# Patient Record
Sex: MALE | Race: WHITE | Employment: UNEMPLOYED | URBAN - METROPOLITAN AREA
[De-identification: names, ages, dates, MRNs, and addresses within clinical notes are randomized per-mention and may not be internally consistent; named-entity substitution may affect disease eponyms.]

---

## 2019-08-24 ENCOUNTER — APPOINTMENT (OUTPATIENT)
Dept: CT IMAGING | Age: 24
End: 2019-08-24
Attending: EMERGENCY MEDICINE
Payer: SELF-PAY

## 2019-08-24 ENCOUNTER — HOSPITAL ENCOUNTER (EMERGENCY)
Age: 24
Discharge: HOME | End: 2019-08-24
Attending: EMERGENCY MEDICINE
Payer: SELF-PAY

## 2019-08-24 ENCOUNTER — APPOINTMENT (OUTPATIENT)
Dept: ULTRASOUND IMAGING | Age: 24
End: 2019-08-24
Attending: EMERGENCY MEDICINE
Payer: SELF-PAY

## 2019-08-24 VITALS
WEIGHT: 269.18 LBS | BODY MASS INDEX: 35.68 KG/M2 | OXYGEN SATURATION: 97 % | DIASTOLIC BLOOD PRESSURE: 102 MMHG | SYSTOLIC BLOOD PRESSURE: 149 MMHG | HEART RATE: 87 BPM | TEMPERATURE: 98.8 F | HEIGHT: 73 IN | RESPIRATION RATE: 18 BRPM

## 2019-08-24 DIAGNOSIS — R19.7 DIARRHEA, UNSPECIFIED TYPE: ICD-10-CM

## 2019-08-24 DIAGNOSIS — E87.6 HYPOKALEMIA: ICD-10-CM

## 2019-08-24 DIAGNOSIS — K76.0 HEPATIC STEATOSIS: ICD-10-CM

## 2019-08-24 DIAGNOSIS — R10.13 ABDOMINAL PAIN, EPIGASTRIC: ICD-10-CM

## 2019-08-24 DIAGNOSIS — K52.9 ILEITIS: Primary | ICD-10-CM

## 2019-08-24 LAB
ALBUMIN SERPL-MCNC: 3.9 G/DL (ref 3.5–5)
ALBUMIN/GLOB SERPL: 1.1 {RATIO} (ref 1.1–2.2)
ALP SERPL-CCNC: 98 U/L (ref 45–117)
ALT SERPL-CCNC: 31 U/L (ref 12–78)
ANION GAP SERPL CALC-SCNC: 10 MMOL/L (ref 5–15)
APPEARANCE UR: CLEAR
AST SERPL-CCNC: 45 U/L (ref 15–37)
BACTERIA URNS QL MICRO: NEGATIVE /HPF
BASOPHILS # BLD: 0.1 K/UL (ref 0–0.1)
BASOPHILS NFR BLD: 1 % (ref 0–1)
BILIRUB SERPL-MCNC: 2.3 MG/DL (ref 0.2–1)
BILIRUB UR QL: NEGATIVE
BUN SERPL-MCNC: 5 MG/DL (ref 6–20)
BUN/CREAT SERPL: 5 (ref 12–20)
CALCIUM SERPL-MCNC: 8.9 MG/DL (ref 8.5–10.1)
CHLORIDE SERPL-SCNC: 102 MMOL/L (ref 97–108)
CO2 SERPL-SCNC: 27 MMOL/L (ref 21–32)
COLOR UR: ABNORMAL
CREAT SERPL-MCNC: 0.98 MG/DL (ref 0.7–1.3)
DIFFERENTIAL METHOD BLD: ABNORMAL
EOSINOPHIL # BLD: 0.3 K/UL (ref 0–0.4)
EOSINOPHIL NFR BLD: 4 % (ref 0–7)
EPITH CASTS URNS QL MICRO: ABNORMAL /LPF
ERYTHROCYTE [DISTWIDTH] IN BLOOD BY AUTOMATED COUNT: 15.6 % (ref 11.5–14.5)
GLOBULIN SER CALC-MCNC: 3.6 G/DL (ref 2–4)
GLUCOSE SERPL-MCNC: 99 MG/DL (ref 65–100)
GLUCOSE UR STRIP.AUTO-MCNC: NEGATIVE MG/DL
HCT VFR BLD AUTO: 40.5 % (ref 36.6–50.3)
HEMOCCULT STL QL: NEGATIVE
HGB BLD-MCNC: 14 G/DL (ref 12.1–17)
HGB UR QL STRIP: NEGATIVE
IMM GRANULOCYTES # BLD AUTO: 0 K/UL (ref 0–0.04)
IMM GRANULOCYTES NFR BLD AUTO: 1 % (ref 0–0.5)
KETONES UR QL STRIP.AUTO: NEGATIVE MG/DL
LEUKOCYTE ESTERASE UR QL STRIP.AUTO: NEGATIVE
LIPASE SERPL-CCNC: 134 U/L (ref 73–393)
LYMPHOCYTES # BLD: 1 K/UL (ref 0.8–3.5)
LYMPHOCYTES NFR BLD: 13 % (ref 12–49)
MAGNESIUM SERPL-MCNC: 2.3 MG/DL (ref 1.6–2.4)
MCH RBC QN AUTO: 33.3 PG (ref 26–34)
MCHC RBC AUTO-ENTMCNC: 34.6 G/DL (ref 30–36.5)
MCV RBC AUTO: 96.2 FL (ref 80–99)
MONOCYTES # BLD: 0.7 K/UL (ref 0–1)
MONOCYTES NFR BLD: 10 % (ref 5–13)
MUCOUS THREADS URNS QL MICRO: ABNORMAL /LPF
NEUTS SEG # BLD: 5.3 K/UL (ref 1.8–8)
NEUTS SEG NFR BLD: 71 % (ref 32–75)
NITRITE UR QL STRIP.AUTO: NEGATIVE
NRBC # BLD: 0 K/UL (ref 0–0.01)
NRBC BLD-RTO: 0 PER 100 WBC
PH UR STRIP: 6 [PH] (ref 5–8)
PLATELET # BLD AUTO: 242 K/UL (ref 150–400)
PMV BLD AUTO: 9.1 FL (ref 8.9–12.9)
POTASSIUM SERPL-SCNC: 3 MMOL/L (ref 3.5–5.1)
PROT SERPL-MCNC: 7.5 G/DL (ref 6.4–8.2)
PROT UR STRIP-MCNC: ABNORMAL MG/DL
RBC # BLD AUTO: 4.21 M/UL (ref 4.1–5.7)
RBC #/AREA URNS HPF: ABNORMAL /HPF (ref 0–5)
SODIUM SERPL-SCNC: 139 MMOL/L (ref 136–145)
SP GR UR REFRACTOMETRY: 1.02 (ref 1–1.03)
UROBILINOGEN UR QL STRIP.AUTO: 0.2 EU/DL (ref 0.2–1)
WBC # BLD AUTO: 7.4 K/UL (ref 4.1–11.1)
WBC URNS QL MICRO: ABNORMAL /HPF (ref 0–4)

## 2019-08-24 PROCEDURE — 81001 URINALYSIS AUTO W/SCOPE: CPT

## 2019-08-24 PROCEDURE — 76705 ECHO EXAM OF ABDOMEN: CPT

## 2019-08-24 PROCEDURE — 96374 THER/PROPH/DIAG INJ IV PUSH: CPT

## 2019-08-24 PROCEDURE — 83690 ASSAY OF LIPASE: CPT

## 2019-08-24 PROCEDURE — 74011250636 HC RX REV CODE- 250/636: Performed by: EMERGENCY MEDICINE

## 2019-08-24 PROCEDURE — 74011000250 HC RX REV CODE- 250: Performed by: EMERGENCY MEDICINE

## 2019-08-24 PROCEDURE — 89055 LEUKOCYTE ASSESSMENT FECAL: CPT

## 2019-08-24 PROCEDURE — 83735 ASSAY OF MAGNESIUM: CPT

## 2019-08-24 PROCEDURE — 74177 CT ABD & PELVIS W/CONTRAST: CPT

## 2019-08-24 PROCEDURE — 87506 IADNA-DNA/RNA PROBE TQ 6-11: CPT

## 2019-08-24 PROCEDURE — 74011636320 HC RX REV CODE- 636/320: Performed by: EMERGENCY MEDICINE

## 2019-08-24 PROCEDURE — 85025 COMPLETE CBC W/AUTO DIFF WBC: CPT

## 2019-08-24 PROCEDURE — 82272 OCCULT BLD FECES 1-3 TESTS: CPT

## 2019-08-24 PROCEDURE — 93005 ELECTROCARDIOGRAM TRACING: CPT

## 2019-08-24 PROCEDURE — 96375 TX/PRO/DX INJ NEW DRUG ADDON: CPT

## 2019-08-24 PROCEDURE — 99285 EMERGENCY DEPT VISIT HI MDM: CPT

## 2019-08-24 PROCEDURE — 36415 COLL VENOUS BLD VENIPUNCTURE: CPT

## 2019-08-24 PROCEDURE — 80053 COMPREHEN METABOLIC PANEL: CPT

## 2019-08-24 RX ORDER — OMEPRAZOLE 20 MG/1
20 TABLET, DELAYED RELEASE ORAL DAILY
COMMUNITY

## 2019-08-24 RX ORDER — ONDANSETRON 4 MG/1
4 TABLET, ORALLY DISINTEGRATING ORAL
Qty: 10 TAB | Refills: 0 | Status: SHIPPED | OUTPATIENT
Start: 2019-08-24

## 2019-08-24 RX ORDER — POTASSIUM CHLORIDE 1.5 G/1.77G
20 POWDER, FOR SOLUTION ORAL DAILY
Qty: 7 PACKET | Refills: 0 | Status: SHIPPED | OUTPATIENT
Start: 2019-08-24

## 2019-08-24 RX ORDER — DICYCLOMINE HYDROCHLORIDE 20 MG/1
20 TABLET ORAL
Qty: 20 TAB | Refills: 0 | Status: SHIPPED | OUTPATIENT
Start: 2019-08-24

## 2019-08-24 RX ORDER — ONDANSETRON 2 MG/ML
4 INJECTION INTRAMUSCULAR; INTRAVENOUS
Status: COMPLETED | OUTPATIENT
Start: 2019-08-24 | End: 2019-08-24

## 2019-08-24 RX ORDER — CEPHALEXIN 500 MG/1
500 CAPSULE ORAL 4 TIMES DAILY
Qty: 28 CAP | Refills: 0 | Status: SHIPPED | OUTPATIENT
Start: 2019-08-24 | End: 2019-08-31

## 2019-08-24 RX ORDER — SODIUM CHLORIDE 0.9 % (FLUSH) 0.9 %
10 SYRINGE (ML) INJECTION
Status: COMPLETED | OUTPATIENT
Start: 2019-08-24 | End: 2019-08-24

## 2019-08-24 RX ORDER — FENTANYL CITRATE 50 UG/ML
50 INJECTION, SOLUTION INTRAMUSCULAR; INTRAVENOUS
Status: COMPLETED | OUTPATIENT
Start: 2019-08-24 | End: 2019-08-24

## 2019-08-24 RX ORDER — METRONIDAZOLE 500 MG/1
500 TABLET ORAL 3 TIMES DAILY
Qty: 21 TAB | Refills: 0 | Status: SHIPPED | OUTPATIENT
Start: 2019-08-24 | End: 2019-08-31

## 2019-08-24 RX ADMIN — IOPAMIDOL 100 ML: 755 INJECTION, SOLUTION INTRAVENOUS at 13:53

## 2019-08-24 RX ADMIN — FAMOTIDINE 20 MG: 10 INJECTION, SOLUTION INTRAVENOUS at 11:31

## 2019-08-24 RX ADMIN — ONDANSETRON 4 MG: 2 INJECTION INTRAMUSCULAR; INTRAVENOUS at 13:10

## 2019-08-24 RX ADMIN — Medication 10 ML: at 13:53

## 2019-08-24 RX ADMIN — FENTANYL CITRATE 50 MCG: 50 INJECTION, SOLUTION INTRAMUSCULAR; INTRAVENOUS at 13:10

## 2019-08-24 NOTE — ED TRIAGE NOTES
Assumed care of pt from triage. Pt CC of abd pain over whole abd and diarrhea x a few months. Pt reports that he came in today because it has not gotten better. Pt denies SOB. Pt report pressure in mid chest.  Pt placed on monitor x3. Call bell in reach. Visitor at bedside.

## 2019-08-24 NOTE — ED PROVIDER NOTES
EMERGENCY DEPARTMENT HISTORY AND PHYSICAL EXAM      Date: 8/24/2019  Patient Name: Radha Garza    History of Presenting Illness     Chief Complaint   Patient presents with    Epigastric Pain     reports upper abd pain radiating into back for months with intermittent vomiting and diarrhea. worse with eating       History Provided By: Patient    HPI: Radha Garza, 22 y.o. male presents to the ED with cc of abdominal pain. Patient's pain has been intermittent for months. He states that it is primarily in the upper abdomen and radiates to the back. Usually lasts for 15 to 20 minutes at a time and is of moderate severity. Currently, the pain is a 5 out of 10 in severity. He also has diarrhea at least 3 times a day over the last month. He denies recent foreign travel or antibiotic use. He also denies fever or chills. He has had intermittent chest pain radiating up from the epigastrium. Denies shortness of breath, headache or dysuria. His friend states that he was a heavy drinker until 2 days ago. There are no other complaints, changes, or physical findings at this time. PCP: Other, MD Karen    No current facility-administered medications on file prior to encounter. Current Outpatient Medications on File Prior to Encounter   Medication Sig Dispense Refill    omeprazole (PRILOSEC OTC) 20 mg tablet Take 20 mg by mouth daily.          Past History     Past Medical History:  Past Medical History:   Diagnosis Date    HA (headache) 9/16/2008    Poison ivy 5/18/2008    Sinusitis 1/8/2009    Streptococcal pharyngitis 4/2/2007    Tinea pedis 5/18/2007       Past Surgical History:  Past Surgical History:   Procedure Laterality Date    NC ASPIRAT/INJECTION GANGLION CYST(S)  Oct 2009       Family History:  Family History   Problem Relation Age of Onset    Immunodeficiency Paternal Grandmother     High Cholesterol Paternal Grandmother     Hypertension Father        Social History:  Social History Tobacco Use    Smoking status: Never Smoker    Smokeless tobacco: Never Used   Substance Use Topics    Alcohol use: Yes     Comment: occationally    Drug use: Yes     Types: Marijuana       Allergies: Allergies   Allergen Reactions    Doxycycline Nausea and Vomiting         Review of Systems   Review of Systems   Constitutional: Negative for fever. HENT: Negative for congestion. Eyes: Negative. Respiratory: Negative for shortness of breath. Cardiovascular: Positive for chest pain. Gastrointestinal: Positive for abdominal pain and diarrhea. Genitourinary: Negative for flank pain. Musculoskeletal: Positive for back pain. Skin: Negative. Allergic/Immunologic: Negative for immunocompromised state. Neurological: Negative for headaches. Psychiatric/Behavioral: Negative. All other systems reviewed and are negative. Physical Exam   Physical Exam   Constitutional: He is oriented to person, place, and time. He appears distressed. Elevated BMI   HENT:   Head: Normocephalic and atraumatic. Eyes: Pupils are equal, round, and reactive to light. Conjunctivae and EOM are normal.   Neck: Normal range of motion. Neck supple. Cardiovascular: Normal rate, regular rhythm, normal heart sounds and intact distal pulses. Pulmonary/Chest: Effort normal and breath sounds normal. He has no wheezes. Abdominal: Soft. Bowel sounds are normal. There is tenderness. Epigastric tenderness   Musculoskeletal: Normal range of motion. He exhibits no edema or tenderness. Neurological: He is alert and oriented to person, place, and time. No cranial nerve deficit. Skin: Skin is warm and dry. Psychiatric: He has a normal mood and affect. His behavior is normal.   Nursing note and vitals reviewed.       Diagnostic Study Results     Labs -     Recent Results (from the past 12 hour(s))   CBC WITH AUTOMATED DIFF    Collection Time: 08/24/19 11:37 AM   Result Value Ref Range    WBC 7.4 4.1 - 11.1 K/uL RBC 4.21 4.10 - 5.70 M/uL    HGB 14.0 12.1 - 17.0 g/dL    HCT 40.5 36.6 - 50.3 %    MCV 96.2 80.0 - 99.0 FL    MCH 33.3 26.0 - 34.0 PG    MCHC 34.6 30.0 - 36.5 g/dL    RDW 15.6 (H) 11.5 - 14.5 %    PLATELET 759 799 - 707 K/uL    MPV 9.1 8.9 - 12.9 FL    NRBC 0.0 0  WBC    ABSOLUTE NRBC 0.00 0.00 - 0.01 K/uL    NEUTROPHILS 71 32 - 75 %    LYMPHOCYTES 13 12 - 49 %    MONOCYTES 10 5 - 13 %    EOSINOPHILS 4 0 - 7 %    BASOPHILS 1 0 - 1 %    IMMATURE GRANULOCYTES 1 (H) 0.0 - 0.5 %    ABS. NEUTROPHILS 5.3 1.8 - 8.0 K/UL    ABS. LYMPHOCYTES 1.0 0.8 - 3.5 K/UL    ABS. MONOCYTES 0.7 0.0 - 1.0 K/UL    ABS. EOSINOPHILS 0.3 0.0 - 0.4 K/UL    ABS. BASOPHILS 0.1 0.0 - 0.1 K/UL    ABS. IMM. GRANS. 0.0 0.00 - 0.04 K/UL    DF AUTOMATED     METABOLIC PANEL, COMPREHENSIVE    Collection Time: 08/24/19 11:37 AM   Result Value Ref Range    Sodium 139 136 - 145 mmol/L    Potassium 3.0 (L) 3.5 - 5.1 mmol/L    Chloride 102 97 - 108 mmol/L    CO2 27 21 - 32 mmol/L    Anion gap 10 5 - 15 mmol/L    Glucose 99 65 - 100 mg/dL    BUN 5 (L) 6 - 20 MG/DL    Creatinine 0.98 0.70 - 1.30 MG/DL    BUN/Creatinine ratio 5 (L) 12 - 20      GFR est AA >60 >60 ml/min/1.73m2    GFR est non-AA >60 >60 ml/min/1.73m2    Calcium 8.9 8.5 - 10.1 MG/DL    Bilirubin, total 2.3 (H) 0.2 - 1.0 MG/DL    ALT (SGPT) 31 12 - 78 U/L    AST (SGOT) 45 (H) 15 - 37 U/L    Alk. phosphatase 98 45 - 117 U/L    Protein, total 7.5 6.4 - 8.2 g/dL    Albumin 3.9 3.5 - 5.0 g/dL    Globulin 3.6 2.0 - 4.0 g/dL    A-G Ratio 1.1 1.1 - 2.2     LIPASE    Collection Time: 08/24/19 11:37 AM   Result Value Ref Range    Lipase 134 73 - 393 U/L       Radiologic Studies -   CT ABD PELV W CONT   Final Result   IMPRESSION:   Focal wall thickening in the terminal ileum suggests inflammatory ileitis. Trace   pelvic free fluid. US ABD LTD   Final Result   IMPRESSION:    Diffusely increased hepatic echogenicity suggests hepatic parenchymal disease,   likely hepatic steatosis.  No evidence of cholecystolithiasis or acute   cholecystitis. CT Results  (Last 48 hours)               08/24/19 1358  CT ABD PELV W CONT Final result    Impression:  IMPRESSION:   Focal wall thickening in the terminal ileum suggests inflammatory ileitis. Trace   pelvic free fluid. Narrative:  EXAM: CT ABD PELV W CONT       INDICATION: Abdominal pain; Diarrhea        COMPARISON: None        CONTRAST: 100 mL of Isovue-370. TECHNIQUE:    Following the uneventful intravenous administration of contrast, thin axial   images were obtained through the abdomen and pelvis. Coronal and sagittal   reconstructions were generated. Oral contrast was not administered. CT dose   reduction was achieved through use of a standardized protocol tailored for this   examination and automatic exposure control for dose modulation. FINDINGS:    LUNG BASES: Clear. INCIDENTALLY IMAGED HEART AND MEDIASTINUM: Unremarkable. LIVER: No mass or biliary dilatation. GALLBLADDER: Unremarkable. SPLEEN: No mass. PANCREAS: No mass or ductal dilatation. ADRENALS: Unremarkable. KIDNEYS: No mass, calculus, or hydronephrosis. STOMACH: Unremarkable. SMALL BOWEL: Focal wall thickening in the terminal ileum. COLON: No dilatation or wall thickening. APPENDIX: Not visualized. PERITONEUM: Trace pelvic free fluid. No pneumoperitoneum. RETROPERITONEUM: No lymphadenopathy or aortic aneurysm. REPRODUCTIVE ORGANS: Normal sized prostate gland. URINARY BLADDER: No mass or calculus. BONES: No destructive bone lesion. ADDITIONAL COMMENTS: N/A               CXR Results  (Last 48 hours)    None          Medical Decision Making   I am the first provider for this patient. I reviewed the vital signs, available nursing notes, past medical history, past surgical history, family history and social history. Vital Signs-Reviewed the patient's vital signs.   Patient Vitals for the past 12 hrs:   Temp Pulse Resp BP SpO2   08/24/19 0942     100 %   08/24/19 0901 98.1 °F (36.7 °C) 89 18 (!) 180/102 100 %       EKG interpretation: (Preliminary)  Rhythm: normal sinus rhythm and PAC's; and regular . Rate (approx.): 72; Axis: normal; NM interval: normal; QRS interval: normal ; ST/T wave: normal; Other findings: no prior EKGs. Records Reviewed: Nursing Notes, Old Medical Records, Previous Radiology Studies and Previous Laboratory Studies    Provider Notes (Medical Decision Making):   Pancreatitis, gastritis, biliary colic, peptic ulcer disease, dehydration, electrolyte abnormality, colitis, diverticulitis, gastroenteritis    ED Course:   Initial assessment performed. The patients presenting problems have been discussed, and they are in agreement with the care plan formulated and outlined with them. I have encouraged them to ask questions as they arise throughout their visit. Progress note: The patient's results have been reviewed. The patient is feeling better. He is advised to follow-up and return to ER if worse         Critical Care Time:   0    Disposition:  home    PLAN:  1. Discharge Medication List as of 8/24/2019  4:43 PM      START taking these medications    Details   dicyclomine (BENTYL) 20 mg tablet Take 1 Tab by mouth every six (6) hours as needed (abdominal cramps) for up to 20 doses. , Normal, Disp-20 Tab, R-0      ondansetron (ZOFRAN ODT) 4 mg disintegrating tablet Take 1 Tab by mouth every eight (8) hours as needed for Nausea., Normal, Disp-10 Tab, R-0      potassium chloride (KLOR-CON) 20 mEq pack Take 1 Packet by mouth daily. , Normal, Disp-7 Packet, R-0      cephALEXin (KEFLEX) 500 mg capsule Take 1 Cap by mouth four (4) times daily for 7 days. , Normal, Disp-28 Cap, R-0      metroNIDAZOLE (FLAGYL) 500 mg tablet Take 1 Tab by mouth three (3) times daily for 7 days. , Normal, Disp-21 Tab, R-0         CONTINUE these medications which have NOT CHANGED    Details   omeprazole (PRILOSEC OTC) 20 mg tablet Take 20 mg by mouth daily., Historical Med           2. Follow-up Information     Follow up With Specialties Details Why Contact Info    See your doctor  In 2 days As needed     Mackenzie Sosa MD Gastroenterology  As needed [address]  [address] [facility]  [address] [facility] [address]  [phone]      [facility] EMERGENCY DEPT Emergency Medicine  If symptoms worsen [address] [facility]  [address] [facility]  [phone]        Return to ED if worse     Diagnosis     Clinical Impression:   1. Ileitis    2. Abdominal pain, epigastric    3. Hepatic steatosis    4. Hypokalemia    5. Diarrhea, unspecified type        Attestations:    Yen Wilson MD    Please note that this dictation was completed with [employer] Touch, the computer voice recognition software. Quite often unanticipated grammatical, syntax, homophones, and other interpretive errors are inadvertently transcribed by the computer software. Please disregard these errors. Please excuse any errors that have escaped final proofreading. Thank you.

## 2019-08-24 NOTE — DISCHARGE INSTRUCTIONS
Patient Education        Abdominal Pain: Care Instructions  Your Care Instructions    Abdominal pain has many possible causes. Some aren't serious and get better on their own in a few days. Others need more testing and treatment. If your pain continues or gets worse, you need to be rechecked and may need more tests to find out what is wrong. You may need surgery to correct the problem. Don't ignore new symptoms, such as fever, nausea and vomiting, urination problems, pain that gets worse, and dizziness. These may be signs of a more serious problem. Your doctor may have recommended a follow-up visit in the next 8 to 12 hours. If you are not getting better, you may need more tests or treatment. The doctor has checked you carefully, but problems can develop later. If you notice any problems or new symptoms, get medical treatment right away. Follow-up care is a key part of your treatment and safety. Be sure to make and go to all appointments, and call your doctor if you are having problems. It's also a good idea to know your test results and keep a list of the medicines you take. How can you care for yourself at home? · Rest until you feel better. · To prevent dehydration, drink plenty of fluids, enough so that your urine is light yellow or clear like water. Choose water and other caffeine-free clear liquids until you feel better. If you have kidney, heart, or liver disease and have to limit fluids, talk with your doctor before you increase the amount of fluids you drink. · If your stomach is upset, eat mild foods, such as rice, dry toast or crackers, bananas, and applesauce. Try eating several small meals instead of two or three large ones. · Wait until 48 hours after all symptoms have gone away before you have spicy foods, alcohol, and drinks that contain caffeine. · Do not eat foods that are high in fat. · Avoid anti-inflammatory medicines such as aspirin, ibuprofen (Advil, Motrin), and naproxen (Aleve). These can cause stomach upset. Talk to your doctor if you take daily aspirin for another health problem. When should you call for help? Call 911 anytime you think you may need emergency care. For example, call if:    · You passed out (lost consciousness).     · You pass maroon or very bloody stools.     · You vomit blood or what looks like coffee grounds.     · You have new, severe belly pain.    Call your doctor now or seek immediate medical care if:    · Your pain gets worse, especially if it becomes focused in one area of your belly.     · You have a new or higher fever.     · Your stools are black and look like tar, or they have streaks of blood.     · You have unexpected vaginal bleeding.     · You have symptoms of a urinary tract infection. These may include:  ? Pain when you urinate. ? Urinating more often than usual.  ? Blood in your urine.     · You are dizzy or lightheaded, or you feel like you may faint.    Watch closely for changes in your health, and be sure to contact your doctor if:    · You are not getting better after 1 day (24 hours). Where can you learn more? Go to http://heshamTenfootlourdes.info/. Enter P166 in the search box to learn more about \"Abdominal Pain: Care Instructions. \"  Current as of: September 23, 2018  Content Version: 12.1  © 1923-2265 [employer]. Care instructions adapted under license by [employer] (which disclaims liability or warranty for this information). If you have questions about a medical condition or this instruction, always ask your healthcare professional. [place] [address] any warranty or liability for your use of this information. Patient Education        Diarrhea: Care Instructions  Your Care Instructions    Diarrhea is loose, watery stools (bowel movements). The exact cause is often hard to find. Sometimes diarrhea is your body's way of getting rid of what caused an upset stomach.  Viruses, food poisoning, and many medicines can cause diarrhea. Some people get diarrhea in response to emotional stress, anxiety, or certain foods. Almost everyone has diarrhea now and then. It usually isn't serious, and your stools will return to normal soon. The important thing to do is replace the fluids you have lost, so you can prevent dehydration. The doctor has checked you carefully, but problems can develop later. If you notice any problems or new symptoms, get medical treatment right away. Follow-up care is a key part of your treatment and safety. Be sure to make and go to all appointments, and call your doctor if you are having problems. It's also a good idea to know your test results and keep a list of the medicines you take. How can you care for yourself at home? · Watch for signs of dehydration, which means your body has lost too much water. Dehydration is a serious condition and should be treated right away. Signs of dehydration are:  ? Increasing thirst and dry eyes and mouth. ? Feeling faint or lightheaded. ? Darker urine, and a smaller amount of urine than normal.  · To prevent dehydration, drink plenty of fluids, enough so that your urine is light yellow or clear like water. Choose water and other caffeine-free clear liquids until you feel better. If you have kidney, heart, or liver disease and have to limit fluids, talk with your doctor before you increase the amount of fluids you drink. · Begin eating small amounts of mild foods the next day, if you feel like it. ? Try yogurt that has live cultures of Lactobacillus. (Check the label.)  ? Avoid spicy foods, fruits, alcohol, and caffeine until 48 hours after all symptoms are gone. ? Avoid chewing gum that contains sorbitol. ? Avoid dairy products (except for yogurt with Lactobacillus) while you have diarrhea and for 3 days after symptoms are gone.   · The doctor may recommend that you take over-the-counter medicine, such as loperamide (Imodium), if you still have diarrhea after 6 hours. Read and follow all instructions on the label. Do not use this medicine if you have bloody diarrhea, a high fever, or other signs of serious illness. Call your doctor if you think you are having a problem with your medicine. When should you call for help? Call 911 anytime you think you may need emergency care. For example, call if:    · You passed out (lost consciousness).     · Your stools are maroon or very bloody.    Call your doctor now or seek immediate medical care if:    · You are dizzy or lightheaded, or you feel like you may faint.     · Your stools are black and look like tar, or they have streaks of blood.     · You have new or worse belly pain.     · You have symptoms of dehydration, such as:  ? Dry eyes and a dry mouth. ? Passing only a little dark urine. ? Feeling thirstier than usual.     · You have a new or higher fever.    Watch closely for changes in your health, and be sure to contact your doctor if:    · Your diarrhea is getting worse.     · You see pus in the diarrhea.     · You are not getting better after 2 days (48 hours). Where can you learn more? Go to http://hesham-lourdes.info/. Enter V903 in the search box to learn more about \"Diarrhea: Care Instructions. \"  Current as of: September 23, 2018  Content Version: 12.1  © 5469-9463 [employer]. Care instructions adapted under license by [employer] (which disclaims liability or warranty for this information). If you have questions about a medical condition or this instruction, always ask your healthcare professional. [place] [address] any warranty or liability for your use of this information. Patient Education        Hypokalemia: Care Instructions  Your Care Instructions    Hypokalemia (say \"[id number]-vb-[id number]-TRUDY-payal-uh\") is a low level of potassium. The heart, muscles, kidneys, and nervous system all need potassium to work well.   This problem has many different causes. Kidney problems, diet, and medicines like diuretics and laxatives can cause it. So can vomiting or diarrhea. In some cases, cancer is the cause. Your doctor may do tests to find the cause of your low potassium levels. You may need medicines to bring your potassium levels back to normal. You may also need regular blood tests to check your potassium. If you have very low potassium, you may need intravenous (IV) medicines. You also may need tests to check the electrical activity of your heart. Heart problems caused by low potassium levels can be very serious. Follow-up care is a key part of your treatment and safety. Be sure to make and go to all appointments, and call your doctor if you are having problems. It's also a good idea to know your test results and keep a list of the medicines you take. How can you care for yourself at home? · If your doctor recommends it, eat foods that have a lot of potassium. These include fresh fruits, juices, and vegetables. They also include nuts, beans, and milk. · Be safe with medicines. If your doctor prescribes medicines or potassium supplements, take them exactly as directed. Call your doctor if you have any problems with your medicines. · Get your potassium levels tested as often as your doctor tells you. When should you call for help? Call 911 anytime you think you may need emergency care. For example, call if:    · You feel like your heart is missing beats.  Heart problems caused by low potassium can cause death.     · You passed out (lost consciousness).     · You have a seizure.    Call your doctor now or seek immediate medical care if:    · You feel weak or unusually tired.     · You have severe arm or leg cramps.     · You have tingling or numbness.     · You feel sick to your stomach, or you vomit.     · You have belly cramps.     · You feel bloated or constipated.     · You have to urinate a lot.     · You feel very thirsty most of the time.     · You are dizzy or lightheaded, or you feel like you may faint.     · You feel depressed, or you lose touch with reality.    Watch closely for changes in your health, and be sure to contact your doctor if:    · You do not get better as expected. Where can you learn more? Go to http://hesham-lourdes.info/. Enter G358 in the search box to learn more about \"Hypokalemia: Care Instructions. \"  Current as of: November 6, 2018  Content Version: 12.1  © 0655-8891 [employer]. Care instructions adapted under license by [employer] (which disclaims liability or warranty for this information). If you have questions about a medical condition or this instruction, always ask your healthcare professional. [address] any warranty or liability for your use of this information. Patient Education        Nonalcoholic Steatohepatitis (CHEN): Care Instructions  Your Care Instructions    Nonalcoholic steatohepatitis (CHEN) is liver inflammation. It is caused by a buildup of fat in the liver. The fat buildup is not caused by drinking alcohol. Because of the inflammation, the liver does not work as well as it should. CHEN is part of a group of liver diseases called nonalcoholic fatty liver disease. In these diseases, fat builds up in the liver and sometimes causes liver damage. This damage can get worse over time. Follow-up care is a key part of your treatment and safety. Be sure to make and go to all appointments, and call your doctor if you are having problems. It's also a good idea to know your test results and keep a list of the medicines you take. How can you care for yourself at home? · Stay at a healthy weight. Or if you need to, slowly get to a healthy weight. · Control your cholesterol. Talk to your doctor about ways to lower your cholesterol, if needed.  You might try getting active, taking medicines, and making healthy changes to your diet.  · Eat healthy foods. This includes fruits, vegetables, lean meats and dairy, and whole grains. · If you have diabetes, keep your blood sugar at your target level. · Get at least 30 minutes of exercise on most days of the week. Walking is a good choice. You also may want to do other activities, such as running, swimming, cycling, or playing tennis or team sports. · Limit alcohol, or do not drink. Alcohol can damage the liver and cause health problems. When should you call for help? Call 911 anytime you think you may need emergency care. For example, call if:    · You have trouble breathing.     · You vomit blood or what looks like coffee grounds.    Call your doctor now or seek immediate medical care if:    · You feel very sleepy or confused.     · You have new or worse belly pain.     · You have a fever.     · There is a new or increasing yellow tint to your skin or the whites of your eyes.     · You have any abnormal bleeding, such as:  ? Nosebleeds. ? Vaginal bleeding that is different (heavier, more frequent, at a different time of the month) than what you are used to.  ? Bloody or black stools, or rectal bleeding. ? Bloody or pink urine.    Watch closely for changes in your health, and be sure to contact your doctor if:    · Your belly is getting bigger.     · You are gaining weight.     · You have any problems. Where can you learn more? Go to http://hesham-lourdes.info/. Enter G575 in the search box to learn more about \"Nonalcoholic Steatohepatitis (CHEN): Care Instructions. \"  Current as of: November 7, 2018  Content Version: 12.1  © 8520-9786 [employer]. Care instructions adapted under license by [employer] (which disclaims liability or warranty for this information).  If you have questions about a medical condition or this instruction, always ask your healthcare professional. [address] any warranty or liability for your use of this information.

## 2019-08-24 NOTE — ED NOTES
Pt discharged by Dr Rollo Bamberger. Pt provided with discharge instructions Rx and instructions on follow up care. Pt out of ED ambulatory without difficulty.

## 2019-08-25 LAB
ATRIAL RATE: 72 BPM
CALCULATED P AXIS, ECG09: 17 DEGREES
CALCULATED R AXIS, ECG10: 6 DEGREES
CALCULATED T AXIS, ECG11: 14 DEGREES
CAMPYLOBACTER SPECIES, DNA: NEGATIVE
DIAGNOSIS, 93000: NORMAL
ENTEROTOXIGEN E COLI, DNA: NEGATIVE
P SHIGELLOIDES DNA STL QL NAA+PROBE: NEGATIVE
P-R INTERVAL, ECG05: 122 MS
Q-T INTERVAL, ECG07: 426 MS
QRS DURATION, ECG06: 104 MS
QTC CALCULATION (BEZET), ECG08: 466 MS
SALMONELLA SPECIES, DNA: NEGATIVE
SHIGA TOXIN PRODUCING, DNA: NEGATIVE
SHIGELLA SP+EIEC IPAH STL QL NAA+PROBE: NEGATIVE
VENTRICULAR RATE, ECG03: 72 BPM
VIBRIO SPECIES, DNA: NEGATIVE
WBC #/AREA STL HPF: NORMAL /HPF (ref 0–4)
Y. ENTEROCOLITICA, DNA: NEGATIVE

## 2020-10-12 ENCOUNTER — HOSPITAL ENCOUNTER (EMERGENCY)
Age: 25
Discharge: HOME | End: 2020-10-12
Attending: EMERGENCY MEDICINE
Payer: MEDICAID

## 2020-10-12 ENCOUNTER — APPOINTMENT (OUTPATIENT)
Dept: CT IMAGING | Age: 25
End: 2020-10-12
Attending: EMERGENCY MEDICINE
Payer: MEDICAID

## 2020-10-12 ENCOUNTER — APPOINTMENT (OUTPATIENT)
Dept: ULTRASOUND IMAGING | Age: 25
End: 2020-10-12
Payer: MEDICAID

## 2020-10-12 VITALS
SYSTOLIC BLOOD PRESSURE: 119 MMHG | BODY MASS INDEX: 41.75 KG/M2 | TEMPERATURE: 98.6 F | HEART RATE: 131 BPM | RESPIRATION RATE: 20 BRPM | OXYGEN SATURATION: 93 % | HEIGHT: 73 IN | WEIGHT: 315 LBS | DIASTOLIC BLOOD PRESSURE: 60 MMHG

## 2020-10-12 DIAGNOSIS — R79.89 ELEVATED LFTS: ICD-10-CM

## 2020-10-12 DIAGNOSIS — R60.9 PERIPHERAL EDEMA: Primary | ICD-10-CM

## 2020-10-12 DIAGNOSIS — K70.9 ALCOHOLIC LIVER DISEASE (HCC): ICD-10-CM

## 2020-10-12 LAB
ALBUMIN SERPL-MCNC: 3.6 G/DL (ref 3.5–5)
ALBUMIN/GLOB SERPL: 0.9 {RATIO} (ref 1.1–2.2)
ALP SERPL-CCNC: 178 U/L (ref 45–117)
ALT SERPL-CCNC: 114 U/L (ref 12–78)
ANION GAP SERPL CALC-SCNC: 6 MMOL/L (ref 5–15)
AST SERPL-CCNC: 207 U/L (ref 15–37)
BASOPHILS # BLD: 0.1 K/UL (ref 0–0.1)
BASOPHILS NFR BLD: 1 % (ref 0–1)
BILIRUB SERPL-MCNC: 0.8 MG/DL (ref 0.2–1)
BNP SERPL-MCNC: 75 PG/ML
BUN SERPL-MCNC: 9 MG/DL (ref 6–20)
BUN/CREAT SERPL: 9 (ref 12–20)
CALCIUM SERPL-MCNC: 9.6 MG/DL (ref 8.5–10.1)
CHLORIDE SERPL-SCNC: 105 MMOL/L (ref 97–108)
CO2 SERPL-SCNC: 29 MMOL/L (ref 21–32)
CREAT SERPL-MCNC: 1.01 MG/DL (ref 0.7–1.3)
DIFFERENTIAL METHOD BLD: ABNORMAL
EOSINOPHIL # BLD: 0.2 K/UL (ref 0–0.4)
EOSINOPHIL NFR BLD: 4 % (ref 0–7)
ERYTHROCYTE [DISTWIDTH] IN BLOOD BY AUTOMATED COUNT: 14.7 % (ref 11.5–14.5)
GLOBULIN SER CALC-MCNC: 4.2 G/DL (ref 2–4)
GLUCOSE SERPL-MCNC: 100 MG/DL (ref 65–100)
HCT VFR BLD AUTO: 41.1 % (ref 36.6–50.3)
HGB BLD-MCNC: 13.3 G/DL (ref 12.1–17)
IMM GRANULOCYTES # BLD AUTO: 0 K/UL (ref 0–0.04)
IMM GRANULOCYTES NFR BLD AUTO: 1 % (ref 0–0.5)
LYMPHOCYTES # BLD: 1.4 K/UL (ref 0.8–3.5)
LYMPHOCYTES NFR BLD: 25 % (ref 12–49)
MCH RBC QN AUTO: 31.4 PG (ref 26–34)
MCHC RBC AUTO-ENTMCNC: 32.4 G/DL (ref 30–36.5)
MCV RBC AUTO: 96.9 FL (ref 80–99)
MONOCYTES # BLD: 0.6 K/UL (ref 0–1)
MONOCYTES NFR BLD: 12 % (ref 5–13)
NEUTS SEG # BLD: 3.2 K/UL (ref 1.8–8)
NEUTS SEG NFR BLD: 57 % (ref 32–75)
NRBC # BLD: 0 K/UL (ref 0–0.01)
NRBC BLD-RTO: 0 PER 100 WBC
PLATELET # BLD AUTO: 330 K/UL (ref 150–400)
PMV BLD AUTO: 9.4 FL (ref 8.9–12.9)
POTASSIUM SERPL-SCNC: 3.6 MMOL/L (ref 3.5–5.1)
PROT SERPL-MCNC: 7.8 G/DL (ref 6.4–8.2)
RBC # BLD AUTO: 4.24 M/UL (ref 4.1–5.7)
SODIUM SERPL-SCNC: 140 MMOL/L (ref 136–145)
TROPONIN I SERPL-MCNC: <0.05 NG/ML
WBC # BLD AUTO: 5.5 K/UL (ref 4.1–11.1)

## 2020-10-12 PROCEDURE — 84484 ASSAY OF TROPONIN QUANT: CPT

## 2020-10-12 PROCEDURE — 74011000636 HC RX REV CODE- 636: Performed by: EMERGENCY MEDICINE

## 2020-10-12 PROCEDURE — 36415 COLL VENOUS BLD VENIPUNCTURE: CPT

## 2020-10-12 PROCEDURE — 80053 COMPREHEN METABOLIC PANEL: CPT

## 2020-10-12 PROCEDURE — 71275 CT ANGIOGRAPHY CHEST: CPT

## 2020-10-12 PROCEDURE — 74011250637 HC RX REV CODE- 250/637: Performed by: EMERGENCY MEDICINE

## 2020-10-12 PROCEDURE — 74011250636 HC RX REV CODE- 250/636: Performed by: EMERGENCY MEDICINE

## 2020-10-12 PROCEDURE — 85025 COMPLETE CBC W/AUTO DIFF WBC: CPT

## 2020-10-12 PROCEDURE — 99284 EMERGENCY DEPT VISIT MOD MDM: CPT

## 2020-10-12 PROCEDURE — 96374 THER/PROPH/DIAG INJ IV PUSH: CPT

## 2020-10-12 PROCEDURE — 93970 EXTREMITY STUDY: CPT

## 2020-10-12 PROCEDURE — 83880 ASSAY OF NATRIURETIC PEPTIDE: CPT

## 2020-10-12 RX ORDER — FUROSEMIDE 40 MG/1
40 TABLET ORAL
Status: COMPLETED | OUTPATIENT
Start: 2020-10-12 | End: 2020-10-12

## 2020-10-12 RX ORDER — LORAZEPAM 2 MG/ML
2 INJECTION INTRAMUSCULAR
Status: COMPLETED | OUTPATIENT
Start: 2020-10-12 | End: 2020-10-12

## 2020-10-12 RX ORDER — SODIUM CHLORIDE 0.9 % (FLUSH) 0.9 %
10 SYRINGE (ML) INJECTION
Status: COMPLETED | OUTPATIENT
Start: 2020-10-12 | End: 2020-10-12

## 2020-10-12 RX ORDER — CHLORDIAZEPOXIDE HYDROCHLORIDE 25 MG/1
50 CAPSULE, GELATIN COATED ORAL
Status: DISCONTINUED | OUTPATIENT
Start: 2020-10-12 | End: 2020-10-13 | Stop reason: HOSPADM

## 2020-10-12 RX ORDER — FUROSEMIDE 40 MG/1
40 TABLET ORAL DAILY
Qty: 20 TAB | Refills: 0 | Status: SHIPPED | OUTPATIENT
Start: 2020-10-12 | End: 2020-11-01

## 2020-10-12 RX ADMIN — IOPAMIDOL 100 ML: 755 INJECTION, SOLUTION INTRAVENOUS at 22:51

## 2020-10-12 RX ADMIN — LORAZEPAM 2 MG: 2 INJECTION INTRAMUSCULAR; INTRAVENOUS at 21:45

## 2020-10-12 RX ADMIN — Medication 10 ML: at 22:51

## 2020-10-12 RX ADMIN — FUROSEMIDE 40 MG: 40 TABLET ORAL at 23:30

## 2020-10-12 NOTE — LETTER
NOTIFICATION RETURN TO WORK 
10/12/2020 11:38 PM 
 
Mr. Edmundo Abraham 
[address] [id number] To Whom It May Concern: 
 
Edmundo Abraham is currently under the care of [facility] EMERGENCY DEPT. He will return to work/school on: 10/16/2020 Edmundo Abraham may return to work w/ no restrictions If there are questions or concerns please have the patient contact our office. Sincerely, Adelfo Rocha

## 2020-10-13 NOTE — ED PROVIDER NOTES
EMERGENCY DEPARTMENT HISTORY AND PHYSICAL EXAM      Date: 10/12/2020  Patient Name: Tala Pizarro    History of Presenting Illness     Chief Complaint   Patient presents with    Leg Swelling     Pt reports bilateral leg swelling x 2 weeks, more so on L leg (hx of ACL surgery June 2020). Patient reports difficulty walking/standing for long periods of time and pain. History Provided By: Patient    HPI: Tala Pizarro, 26 y.o. male  With recent medical history in the end of June of a ACL repair on the left side and alcohol use disorder presenting today with bilateral lower extremity swelling. The patient states that he has noticed this for about 2 weeks. He initially had more swelling on the left side as opposed to the right side and then started having severe swelling. He notes that his legs feel extremely tight. He is a daily drinker and drinks either 1/5 or 2/5 of [employer]. He states that he does have alcohol withdrawal and has had seizures in the past due to alcohol withdrawal.  He states that he has difficulty walking or standing for long peers of time due to pain. He also notes that he has been having intermittent episodes of chest pain or shortness of breath. The patient denies any previous history of DVT or PE. He denies any fevers, cough, chills with this present illness. There are no other complaints, changes, or physical findings at this time. PCP: Other, MD Karen    No current facility-administered medications on file prior to encounter. Current Outpatient Medications on File Prior to Encounter   Medication Sig Dispense Refill    omeprazole (PRILOSEC OTC) 20 mg tablet Take 20 mg by mouth daily.  dicyclomine (BENTYL) 20 mg tablet Take 1 Tab by mouth every six (6) hours as needed (abdominal cramps) for up to 20 doses. 20 Tab 0    ondansetron (ZOFRAN ODT) 4 mg disintegrating tablet Take 1 Tab by mouth every eight (8) hours as needed for Nausea.  10 Tab 0    potassium chloride (KLOR-CON) 20 mEq pack Take 1 Packet by mouth daily. 7 Packet 0       Past History     Past Medical History:  Past Medical History:   Diagnosis Date    HA (headache) 9/16/2008    Poison ivy 5/18/2008    Sinusitis 1/8/2009    Streptococcal pharyngitis 4/2/2007    Tinea pedis 5/18/2007       Past Surgical History:  Past Surgical History:   Procedure Laterality Date    DC ASPIRAT/INJECTION GANGLION CYST(S)  Oct 2009       Family History:  Family History   Problem Relation Age of Onset    Immunodeficiency Paternal Grandmother     High Cholesterol Paternal Grandmother     Hypertension Father        Social History:  Social History     Tobacco Use    Smoking status: Never Smoker    Smokeless tobacco: Never Used   Substance Use Topics    Alcohol use: Yes     Comment: occationally    Drug use: Yes     Types: Marijuana       Allergies: Allergies   Allergen Reactions    Doxycycline Nausea and Vomiting         Review of Systems   Constitutional: No  fever  Skin: No  rash  HEENT: No  nasal congestion  Resp: No cough  CV: No chest pain  GI: No vomiting  : No dysuria  MSK: No joint pain  Neuro: No numbness  Psych: No suicidal      Physical Exam     Patient Vitals for the past 12 hrs:   Temp Pulse Resp BP SpO2   10/12/20 2330  (!) 131  119/60    10/12/20 2230  (!) 129   93 %   10/12/20 2215  (!) 125   92 %   10/12/20 2200  (!) 129   94 %   10/12/20 2145  (!) 125   91 %   10/12/20 2130  (!) 122  (!) 146/64 94 %   10/12/20 2115  (!) 116   93 %   10/12/20 2100  (!) 118  (!) 141/74 95 %   10/12/20 2045  (!) 121   95 %   10/12/20 2037  (!) 124   95 %   10/12/20 2033     95 %   10/12/20 2032    (!) 155/78    10/12/20 1816 98.6 °F (37 °C) (!) 118 20 (!) 163/96 94 %     General: alert, No acute distress, slightly tremulous  Eyes: EOMI, normal conjunctiva  ENT: moist mucous membranes. Neck: Active, full ROM of neck. Skin: No rashes. no jaundice              Lungs: Equal chest expansion. no respiratory distress. clear to auscultation bilaterally No accessory muscle usage  Heart: tachycardic with a  regular rhythm     2+ pitting edema bilaterally   2+ radial pulses and DPs bilaterally  Abd:  non distended soft, nontender. No rebound tenderness. No guarding  Back: Full ROM  MSK: Full, active ROM in all 4 extremities. Neuro: Person, Place, Time and Situation; normal speech;   Psych: Cooperative with exam; Appropriate mood and affect             Diagnostic Study Results     Labs -     Recent Results (from the past 12 hour(s))   CBC WITH AUTOMATED DIFF    Collection Time: 10/12/20  6:30 PM   Result Value Ref Range    WBC 5.5 4.1 - 11.1 K/uL    RBC 4.24 4.10 - 5.70 M/uL    HGB 13.3 12.1 - 17.0 g/dL    HCT 41.1 36.6 - 50.3 %    MCV 96.9 80.0 - 99.0 FL    MCH 31.4 26.0 - 34.0 PG    MCHC 32.4 30.0 - 36.5 g/dL    RDW 14.7 (H) 11.5 - 14.5 %    PLATELET 101 949 - 614 K/uL    MPV 9.4 8.9 - 12.9 FL    NRBC 0.0 0  WBC    ABSOLUTE NRBC 0.00 0.00 - 0.01 K/uL    NEUTROPHILS 57 32 - 75 %    LYMPHOCYTES 25 12 - 49 %    MONOCYTES 12 5 - 13 %    EOSINOPHILS 4 0 - 7 %    BASOPHILS 1 0 - 1 %    IMMATURE GRANULOCYTES 1 (H) 0.0 - 0.5 %    ABS. NEUTROPHILS 3.2 1.8 - 8.0 K/UL    ABS. LYMPHOCYTES 1.4 0.8 - 3.5 K/UL    ABS. MONOCYTES 0.6 0.0 - 1.0 K/UL    ABS. EOSINOPHILS 0.2 0.0 - 0.4 K/UL    ABS. BASOPHILS 0.1 0.0 - 0.1 K/UL    ABS. IMM.  GRANS. 0.0 0.00 - 0.04 K/UL    DF AUTOMATED     METABOLIC PANEL, COMPREHENSIVE    Collection Time: 10/12/20  6:30 PM   Result Value Ref Range    Sodium 140 136 - 145 mmol/L    Potassium 3.6 3.5 - 5.1 mmol/L    Chloride 105 97 - 108 mmol/L    CO2 29 21 - 32 mmol/L    Anion gap 6 5 - 15 mmol/L    Glucose 100 65 - 100 mg/dL    BUN 9 6 - 20 MG/DL    Creatinine 1.01 0.70 - 1.30 MG/DL    BUN/Creatinine ratio 9 (L) 12 - 20      GFR est AA >60 >60 ml/min/1.73m2    GFR est non-AA >60 >60 ml/min/1.73m2    Calcium 9.6 8.5 - 10.1 MG/DL    Bilirubin, total 0.8 0.2 - 1.0 MG/DL ALT (SGPT) 114 (H) 12 - 78 U/L    AST (SGOT) 207 (H) 15 - 37 U/L    Alk. phosphatase 178 (H) 45 - 117 U/L    Protein, total 7.8 6.4 - 8.2 g/dL    Albumin 3.6 3.5 - 5.0 g/dL    Globulin 4.2 (H) 2.0 - 4.0 g/dL    A-G Ratio 0.9 (L) 1.1 - 2.2     NT-PRO BNP    Collection Time: 10/12/20  6:30 PM   Result Value Ref Range    NT pro-BNP 75 <125 PG/ML   TROPONIN I    Collection Time: 10/12/20  9:44 PM   Result Value Ref Range    Troponin-I, Qt. <0.05 <0.05 ng/mL       Radiologic Studies -   CTA CHEST W OR W WO CONT   Final Result   IMPRESSION:   1. No evidence of pulmonary embolus. 2.  Clear lungs. 3. Diffuse fatty infiltration of the liver. CT Results  (Last 48 hours)               10/12/20 2251  CTA CHEST W OR W WO CONT Final result    Impression:  IMPRESSION:   1. No evidence of pulmonary embolus. 2.  Clear lungs. 3. Diffuse fatty infiltration of the liver. Narrative:  INDICATION:   shortness of breath        COMPARISON:  None       TECHNIQUE:  Following the uneventful intravenous administration of 100 cc Isovue   938, thin helical axial images were obtained through the chest. Postprocessing   was performed. 3D image postprocessing was performed. CT dose reduction was achieved through the use of a standardized protocol   tailored for this examination and automatic exposure control for dose   modulation. FINDINGS:       There are no enlarged mediastinal or hilar lymph nodes. The heart size is   normal.  There is no pericardial effusion. No filling defect is seen within the pulmonary arterial system to suggest   pulmonary embolus. The aorta is normal in caliber. There is no focal air space disease. No pulmonary nodule or mass is seen. There   are no pleural effusions. Limited evaluation of the upper abdomen demonstrates diffuse fatty infiltration   of the liver. The osseous structures are unremarkable.                CXR Results  (Last 48 hours)    None Medical Decision Making   I am the first provider for this patient. I reviewed the vital signs, available nursing notes, past medical history, past surgical history, family history and social history. Vital Signs-Reviewed the patient's vital signs. Patient Vitals for the past 12 hrs:   Temp Pulse Resp BP SpO2   10/12/20 2330  (!) 131  119/60    10/12/20 2230  (!) 129   93 %   10/12/20 2215  (!) 125   92 %   10/12/20 2200  (!) 129   94 %   10/12/20 2145  (!) 125   91 %   10/12/20 2130  (!) 122  (!) 146/64 94 %   10/12/20 2115  (!) 116   93 %   10/12/20 2100  (!) 118  (!) 141/74 95 %   10/12/20 2045  (!) 121   95 %   10/12/20 2037  (!) 124   95 %   10/12/20 2033     95 %   10/12/20 2032    (!) 155/78    10/12/20 1816 98.6 °F (37 °C) (!) 118 20 (!) 163/96 94 %       Pulse Oximetry Analysis - 95% on room air    Cardiac Monitor:   Rate: 124 bpm  Rhythm: Sinus Tachycardia      Provider Notes (Medical Decision Making):     Differential Diagnosis: DVT, pulmonary embolus, electrolyte derangement, liver disease, venous insufficiency    Initial Plan: Will obtain basic laboratory studies, bilateral DVT ultrasounds, CT of the chest.  The patient is slightly tremulous and notes that his last alcoholic beverage was 3 to 4 hours ago. We will treat him with lorazepam and chlordiazepoxide to prevent any withdrawal symptoms. The patient has oxygen sats at 95 on room air without any tachypnea. He is tachycardic although I suspect this is more due to alcohol withdrawal other than a primary cardiac etiology. Will reassess after work-up. ED Course:   Initial assessment performed. The patients presenting problems have been discussed, and they are in agreement with the care plan formulated and outlined with them. I have encouraged them to ask questions as they arise throughout their visit.     ED Course as of Oct 13 0147   Mon Oct 12, 2020   2321 On my interpretation of the patient's laboratory studies CBC is unremarkable, metabolic panel reveals evidence of elevated AST and ALT, but otherwise unremarkable, BNP is negative and troponin is negative. [NW]   1019 On my interpretation of the patient's CT no evidence of pulmonary embolus, DVT ultrasound showed no evidence of DVT. [NW]   6733 Patient comes in today with bilateral lower extremity pain and swelling. The patient does have pitting edema to bilateral lower extremities, initially concerning for DVT, the DVT ultrasounds are negative. The patient CT angiography is also negative and he was complaining of some shortness of breath. I suspect that he has edema related to his liver disease. The CT does show evidence of fatty infiltration of the liver and the patient has elevated LFTs in addition to drinking alcohol daily. We discussed that this could be the case. I will place him on Lasix to help with the symptomatic edema. Will refer him to GI for further evaluation. Patient is discharged with return precautions. I did treat him with quarters epoxide and Ativan and this did not improve his tremulousness. We discussed alcohol detox and I encouraged him that he can certainly use the emergency department and other healthcare services as a resource and to continue to try to quit drinking alcohol.    [NW]      ED Course User Index  [NW] Josh Alvarenga MD       I, Porfirio Hilario MD, am the attending of record for this patient encounter. Dispo: Discharged. The patient has been re-evaluated and is ready for discharge. Reviewed available results with patient. Counseled patient on diagnosis and care plan. Patient has expressed understanding, and all questions have been answered. Patient agrees with plan and agrees to follow up as recommended, or to return to the ED if their symptoms worsen. Discharge instructions have been provided and explained to the patient, along with reasons to return to the ED.        PLAN:  Discharge Medication List as of 10/12/2020 11:25 PM      START taking these medications    Details   furosemide (Lasix) 40 mg tablet Take 1 Tab by mouth daily for 20 days. , Normal, Disp-20 Tab,R-0         CONTINUE these medications which have NOT CHANGED    Details   omeprazole (PRILOSEC OTC) 20 mg tablet Take 20 mg by mouth daily. , Historical Med      dicyclomine (BENTYL) 20 mg tablet Take 1 Tab by mouth every six (6) hours as needed (abdominal cramps) for up to 20 doses. , Normal, Disp-20 Tab, R-0      ondansetron (ZOFRAN ODT) 4 mg disintegrating tablet Take 1 Tab by mouth every eight (8) hours as needed for Nausea., Normal, Disp-10 Tab, R-0      potassium chloride (KLOR-CON) 20 mEq pack Take 1 Packet by mouth daily. , Normal, Disp-7 Packet, R-0           2. Follow-up Information     Follow up With Specialties Details Why Daron Hill MD Gastroenterology   [address]  [phone]          3. Return to ED if worse       Diagnosis     Clinical Impression:   1. Peripheral edema    2. Elevated LFTs    3. Alcoholic liver disease ([facility] [address]        Attestations:    Willian Olson MD    Please note that this dictation was completed with [employer], the computer voice recognition software. Quite often unanticipated grammatical, syntax, homophones, and other interpretive errors are inadvertently transcribed by the computer software. Please disregard these errors. Please excuse any errors that have escaped final proofreading. Thank you.

## 2021-01-10 ENCOUNTER — HOSPITAL ENCOUNTER (EMERGENCY)
Age: 26
Discharge: HOME | End: 2021-01-10
Attending: EMERGENCY MEDICINE
Payer: MEDICAID

## 2021-01-10 ENCOUNTER — APPOINTMENT (OUTPATIENT)
Dept: ULTRASOUND IMAGING | Age: 26
End: 2021-01-10
Attending: PHYSICIAN ASSISTANT
Payer: MEDICAID

## 2021-01-10 VITALS
TEMPERATURE: 98 F | BODY MASS INDEX: 41.75 KG/M2 | SYSTOLIC BLOOD PRESSURE: 141 MMHG | OXYGEN SATURATION: 94 % | WEIGHT: 315 LBS | RESPIRATION RATE: 24 BRPM | HEIGHT: 73 IN | HEART RATE: 114 BPM | DIASTOLIC BLOOD PRESSURE: 67 MMHG

## 2021-01-10 DIAGNOSIS — R00.0 TACHYCARDIA: ICD-10-CM

## 2021-01-10 DIAGNOSIS — M79.89 RIGHT LEG SWELLING: Primary | ICD-10-CM

## 2021-01-10 PROCEDURE — 99282 EMERGENCY DEPT VISIT SF MDM: CPT

## 2021-01-10 PROCEDURE — 93971 EXTREMITY STUDY: CPT

## 2021-01-10 NOTE — ED PROVIDER NOTES
EMERGENCY DEPARTMENT HISTORY AND PHYSICAL EXAM      Date: 1/10/2021  Patient Name: Americo Lesches    History of Presenting Illness     Chief Complaint   Patient presents with    Leg Swelling     Patient ambulatory to triage w c/o R leg swelling onset \"months\" ago. History Provided By: Patient    HPI: Americo Lesches, 26 y.o. male with no reported PMHx  presents to the ED with request for RLE U/S. Pt has had b/l but R>L LE swelling x 4 months. He went to [address] [place] [address] today and was referred to the ED for U/S. He denies acute change in edema, injury, rash, CP, SOB. No h/o DVT or PE. There are no other complaints, changes, or physical findings at this time. PCP: Juan, MD Karen    No current facility-administered medications on file prior to encounter. Current Outpatient Medications on File Prior to Encounter   Medication Sig Dispense Refill    omeprazole (PRILOSEC OTC) 20 mg tablet Take 20 mg by mouth daily.  dicyclomine (BENTYL) 20 mg tablet Take 1 Tab by mouth every six (6) hours as needed (abdominal cramps) for up to 20 doses. 20 Tab 0    ondansetron (ZOFRAN ODT) 4 mg disintegrating tablet Take 1 Tab by mouth every eight (8) hours as needed for Nausea. 10 Tab 0    potassium chloride (KLOR-CON) 20 mEq pack Take 1 Packet by mouth daily.  7 Packet 0       Past History     Past Medical History:  Past Medical History:   Diagnosis Date    HA (headache) 9/16/2008    Poison ivy 5/18/2008    Sinusitis 1/8/2009    Streptococcal pharyngitis 4/2/2007    Tinea pedis 5/18/2007       Past Surgical History:  Past Surgical History:   Procedure Laterality Date    [place] ASPIRAT/INJECTION GANGLION CYST(S)  Oct 2009       Family History:  Family History   Problem Relation Age of Onset    Immunodeficiency Paternal Grandmother     High Cholesterol Paternal Grandmother     Hypertension Father        Social History:  Social History     Tobacco Use    Smoking status: Never Smoker    Smokeless tobacco: Never Used   Substance Use Topics    Alcohol use: Yes     Comment: occationally    Drug use: Yes     Types: Marijuana       Allergies: Allergies   Allergen Reactions    Doxycycline Nausea and Vomiting         Review of Systems   Review of Systems   Constitutional: Negative for fever. Respiratory: Negative for shortness of breath. Cardiovascular: Negative for chest pain. Musculoskeletal: Positive for arthralgias. Negative for gait problem. Skin: Negative for rash. All other systems reviewed and are negative. Physical Exam   Physical Exam  Vitals signs and nursing note reviewed. Constitutional:       General: He is not in acute distress. Appearance: Normal appearance. HENT:      Head: Normocephalic and atraumatic. Eyes:      Extraocular Movements: Extraocular movements intact. Conjunctiva/sclera: Conjunctivae normal.   Neck:      Musculoskeletal: Normal range of motion and neck supple. Trachea: Phonation normal.   Cardiovascular:      Rate and Rhythm: Regular rhythm. Tachycardia present. Pulmonary:      Effort: Pulmonary effort is normal.      Breath sounds: Normal breath sounds. Musculoskeletal: Normal range of motion. Comments: B/L LE swelling, R slightly >R. Venous stasis changes. No pitting. No palpable cords. Mild tenderness throughout the R LE. NV intact. Skin:     General: Skin is warm and dry. Neurological:      Mental Status: He is alert and oriented to person, place, and time. GCS: GCS eye subscore is 4. GCS verbal subscore is 5. GCS motor subscore is 6. Psychiatric:         Mood and Affect: Mood normal.         Behavior: Behavior normal.         Diagnostic Study Results     Labs -   No results found for this or any previous visit (from the past 12 hour(s)). Radiologic Studies -   Right Lower Venous    No evidence of deep vein thrombosis in the common femoral, profunda femoral, femoral, popliteal, posterior tibial, and peroneal veins.   The veins were imaged in the transverse and longitudinal planes. The vessels showed normal color filling and compressibility. Doppler interrogation showed phasic and spontaneous flow. No orders to display     CT Results  (Last 48 hours)    None        CXR Results  (Last 48 hours)    None            Medical Decision Making   I am the first provider for this patient. I reviewed the vital signs, available nursing notes, past medical history, past surgical history, family history and social history. Vital Signs-Reviewed the patient's vital signs. Patient Vitals for the past 12 hrs:   Temp Pulse Resp BP SpO2   01/10/21 1549  (!) 114      01/10/21 1402 98 °F (36.7 °C) (!) 128 24 (!) 141/67 94 %       Records Reviewed: Nursing Notes and Old Medical Records    Provider Notes (Medical Decision Making):   No DVT on U/S. Heart rate noted to be elevated at 128 on arrival.  Rechecked, 114. Patient is asymptomatic. He states he is likely dehydrated and always runs tachy. He admits to daily EtOH intake, last drink was a [employer] Lemonade this morning. Discussed further work-up, however patient declines. He states he does not want to stay for more tests. ED Course:   Initial assessment performed. The patients presenting problems have been discussed, and they are in agreement with the care plan formulated and outlined with them. I have encouraged them to ask questions as they arise throughout their visit. Critical Care Time: None    Disposition:  D/c    PLAN:  1. Discharge Medication List as of 1/10/2021  3:57 PM        2. Follow-up Information     Follow up With Specialties Details Why Contact Info    Your PCP  Call  For follow up         Return to ED if worse     Diagnosis     Clinical Impression:   1. Right leg swelling    2. Tachycardia          Please note that this dictation was completed with [employer], the [employer] voice recognition software.  Quite often unanticipated grammatical, syntax, homophones, and other interpretive errors are inadvertently transcribed by the computer software. Please disregards these errors. Please excuse any errors that have escaped final proofreading.

## 2022-02-26 ENCOUNTER — HOSPITAL ENCOUNTER (EMERGENCY)
Age: 27
Discharge: HOME | End: 2022-02-26
Attending: EMERGENCY MEDICINE
Payer: MEDICAID

## 2022-02-26 ENCOUNTER — APPOINTMENT (OUTPATIENT)
Dept: GENERAL RADIOLOGY | Age: 27
End: 2022-02-26
Attending: EMERGENCY MEDICINE
Payer: MEDICAID

## 2022-02-26 VITALS
WEIGHT: 294.7 LBS | SYSTOLIC BLOOD PRESSURE: 123 MMHG | OXYGEN SATURATION: 97 % | HEART RATE: 97 BPM | TEMPERATURE: 98.6 F | DIASTOLIC BLOOD PRESSURE: 75 MMHG | RESPIRATION RATE: 17 BRPM | BODY MASS INDEX: 38.88 KG/M2

## 2022-02-26 DIAGNOSIS — T14.8XXA PUNCTURE WOUND: Primary | ICD-10-CM

## 2022-02-26 DIAGNOSIS — Z23 NEED FOR TETANUS BOOSTER: ICD-10-CM

## 2022-02-26 PROCEDURE — 74011250636 HC RX REV CODE- 250/636: Performed by: EMERGENCY MEDICINE

## 2022-02-26 PROCEDURE — 99284 EMERGENCY DEPT VISIT MOD MDM: CPT

## 2022-02-26 PROCEDURE — 73630 X-RAY EXAM OF FOOT: CPT

## 2022-02-26 PROCEDURE — 90471 IMMUNIZATION ADMIN: CPT

## 2022-02-26 PROCEDURE — 75810000293 HC SIMP/SUPERF WND  RPR

## 2022-02-26 PROCEDURE — 90715 TDAP VACCINE 7 YRS/> IM: CPT | Performed by: EMERGENCY MEDICINE

## 2022-02-26 PROCEDURE — 74011250637 HC RX REV CODE- 250/637: Performed by: EMERGENCY MEDICINE

## 2022-02-26 PROCEDURE — 99285 EMERGENCY DEPT VISIT HI MDM: CPT

## 2022-02-26 RX ORDER — IBUPROFEN 400 MG/1
800 TABLET ORAL
Status: COMPLETED | OUTPATIENT
Start: 2022-02-26 | End: 2022-02-26

## 2022-02-26 RX ORDER — BACITRACIN 500 [USP'U]/G
OINTMENT TOPICAL 3 TIMES DAILY
Qty: 14 G | Refills: 0 | Status: SHIPPED | OUTPATIENT
Start: 2022-02-26 | End: 2022-03-08

## 2022-02-26 RX ORDER — CEPHALEXIN 500 MG/1
500 CAPSULE ORAL 3 TIMES DAILY
Qty: 21 CAPSULE | Refills: 0 | Status: SHIPPED | OUTPATIENT
Start: 2022-02-26 | End: 2022-03-05

## 2022-02-26 RX ORDER — CEPHALEXIN 500 MG/1
500 CAPSULE ORAL
Status: COMPLETED | OUTPATIENT
Start: 2022-02-26 | End: 2022-02-26

## 2022-02-26 RX ADMIN — TETANUS TOXOID, REDUCED DIPHTHERIA TOXOID AND ACELLULAR PERTUSSIS VACCINE, ADSORBED 0.5 ML: 5; 2.5; 8; 8; 2.5 SUSPENSION INTRAMUSCULAR at 19:07

## 2022-02-26 RX ADMIN — IBUPROFEN 800 MG: 400 TABLET, FILM COATED ORAL at 19:41

## 2022-02-26 RX ADMIN — CEPHALEXIN 500 MG: 500 CAPSULE ORAL at 19:41

## 2022-02-26 NOTE — ED PROVIDER NOTES
EMERGENCY DEPARTMENT HISTORY AND PHYSICAL EXAM      Date: 2/26/2022  Patient Name: Pete Martel    History of Presenting Illness     Chief Complaint   Patient presents with    Laceration       History Provided By: Patient    HPI: Pete Martel, 23 y.o. male presents to the ED with cc of laceration. 42-year-old male with history of alcohol abuse and recent diagnosis of jaundice presents emergency department after a laceration. Patient reports he was reaching into the fridge and his sleeve caught a  board. He reports the board and knife fell down on his right foot. He reports he sustained a laceration with \"a large amount of blood loss. \"    Patient reports pain over his forefoot. Worse with palpation associated with laceration. He reports his last tetanus shot was 7 years ago. Otherwise in his normal state of health. Patient does admit to drinking a large amount of alcohol daily. His last drink was just prior to arrival.  Patient does report that he was recently admitted to [facility] For \"jaundice\". He is scheduled to see outpatient GI in March. This was attributed to his alcohol use. He denies any blood thinner use or history of easy bleeding or bruising. Denies any worsening jaundice on exam.    There are no other complaints, changes, or physical findings at this time. PCP: None    No current facility-administered medications on file prior to encounter. Current Outpatient Medications on File Prior to Encounter   Medication Sig Dispense Refill    omeprazole (PRILOSEC OTC) 20 mg tablet Take 20 mg by mouth daily.  dicyclomine (BENTYL) 20 mg tablet Take 1 Tab by mouth every six (6) hours as needed (abdominal cramps) for up to 20 doses. 20 Tab 0    ondansetron (ZOFRAN ODT) 4 mg disintegrating tablet Take 1 Tab by mouth every eight (8) hours as needed for Nausea. 10 Tab 0    potassium chloride (KLOR-CON) 20 mEq pack Take 1 Packet by mouth daily.  7 Packet 0       Past History     Past Medical History:  Past Medical History:   Diagnosis Date    HA (headache) 9/16/2008    Poison ivy 5/18/2008    Sinusitis 1/8/2009    Streptococcal pharyngitis 4/2/2007    Tinea pedis 5/18/2007       Past Surgical History:  Past Surgical History:   Procedure Laterality Date    [place] ASPIRAT/INJECTION GANGLION CYST(S)  Oct 2009       Family History:  Family History   Problem Relation Age of Onset    Immunodeficiency Paternal Grandmother     High Cholesterol Paternal Grandmother     Hypertension Father        Social History:  Social History     Tobacco Use    Smoking status: Never Smoker    Smokeless tobacco: Never Used   Substance Use Topics    Alcohol use: Yes     Comment: occationally    Drug use: Yes     Types: Marijuana       Allergies: Allergies   Allergen Reactions    Doxycycline Not Reported This Time         Review of Systems   Review of Systems   Constitutional: Negative for chills and fever. HENT: Negative for voice change. Eyes: Negative for pain and redness. Respiratory: Negative for cough and chest tightness. Cardiovascular: Negative for chest pain and leg swelling. Gastrointestinal: Negative for abdominal distention, abdominal pain, diarrhea, nausea and vomiting. Genitourinary: Negative for hematuria. Musculoskeletal: Positive for arthralgias. Negative for gait problem. Skin: Positive for color change and wound. Negative for pallor and rash. Neurological: Negative for facial asymmetry, weakness and headaches. Hematological: Does not bruise/bleed easily. Psychiatric/Behavioral: Negative for behavioral problems. All other systems reviewed and are negative. Physical Exam   Physical Exam  Vitals and nursing note reviewed. Constitutional:       Comments: 15-year-old male, resting in bed, no distress   HENT:      Head: Normocephalic and atraumatic.       Nose: Nose normal.      Mouth/Throat:      Mouth: Mucous membranes are moist.   Eyes: General: Scleral icterus present. Pupils: Pupils are equal, round, and reactive to light. Cardiovascular:      Rate and Rhythm: Normal rate and regular rhythm. Pulses: Normal pulses. Heart sounds: No murmur heard. No friction rub. No gallop. Pulmonary:      Effort: Pulmonary effort is normal.      Breath sounds: Normal breath sounds. No wheezing, rhonchi or rales. Abdominal:      General: Abdomen is flat. There is no distension. Palpations: Abdomen is soft. Tenderness: There is no abdominal tenderness. Musculoskeletal:         General: Tenderness present. No swelling. Normal range of motion. Cervical back: Normal range of motion. Right lower leg: No edema. Left lower leg: No edema. Comments: Tenderness to palpation over dorsum of right foot, most pronounced over the fourth metatarsal.   Skin:     General: Skin is warm and dry. Capillary Refill: Capillary refill takes less than 2 seconds. Comments: Small nonbleeding, linear 1 mm laceration over the dorsum of the foot. Neurological:      General: No focal deficit present. Mental Status: He is alert. Psychiatric:         Mood and Affect: Mood normal.         Diagnostic Study Results     Labs -   No results found for this or any previous visit (from the past 12 hour(s)). Radiologic Studies -   XR FOOT RT MIN 3 V   Final Result   No fracture identified        CT Results  (Last 48 hours)    None        CXR Results  (Last 48 hours)    None          Medical Decision Making   I am the first provider for this patient. I reviewed the vital signs, available nursing notes, past medical history, past surgical history, family history and social history. Vital Signs-Reviewed the patient's vital signs.   Patient Vitals for the past 12 hrs:   Temp Pulse Resp BP SpO2   02/26/22 1942 98.6 °F (37 °C) 97 17 123/75 97 %   02/26/22 1915    (!) 141/62 95 %   02/26/22 1802 98.5 °F (36.9 °C) 89 18 (!) 142/80 100 %     Records Reviewed: Nursing Notes and Old Medical Records    Provider Notes (Medical Decision Making):     80-year-old male with a history of alcohol abuse and jaundice presents emergency department with a chief complaint of laceration. Of note, he was admitted to an outside hospital for jaundice and is following with GI. This is attributed to his alcohol use, denies any history of cirrhosis. He noted a large amount of bleeding after this laceration but denies any history of easy bleeding or bruising. He appears jaundiced consistent with his history. As he is not actively bleeding here, and follows with GI, and this is not his presenting complaint I am not inclined to work of his jaundice in the emergency department. We will update patient's tetanus and get a plain film of his right foot to rule out fracture. Given this is not bleeding and a possible puncture wound would opt for secondary healing. ED Course:   Initial assessment performed. The patients presenting problems have been discussed, and they are in agreement with the care plan formulated and outlined with them. I have encouraged them to ask questions as they arise throughout their visit. No fx, tetanus updated. Repaired by dermabond by my coleage, Dr. Kin Kirby. D/c with antibiotics and bacitracin. Encouraged GI follow-up. Wound precautions. Procedure Note - Laceration Repair:  8:12 PM  Procedure by Dr. Humza Hunt: simple   0.2 cm linear laceration to foot was irrigated. Wound cleaned with chlorprep. The wound was repaired with Dermabond and steri-strips. The wound was closed with good hemostasis and approximation. Sterile dressing applied. Estimated blood loss: minimal  The procedure took 1-15 minutes, and pt tolerated well. Stephan Ng MD    Progress Note:  9:00 PM  Pt states he feels much better after ED treatment; pt able to tolerate PO and ambulate per baseline. Pt is clinically safe for discharge.  Nelly Weems Paolatu's final labs and imaging have been reviewed with him. He has been counseled regarding his diagnosis. He verbally conveys understanding and agreement of the signs, symptoms, diagnosis, treatment and prognosis and additionally agrees to follow up as recommended with Dr. None in 24 - 48 hours. All questions have been answered, pt voiced understanding and agreement with plan. Specific return precautions provided as well as instructions to return to the ED should sx worsen at any time. At time of discharge, pt had stable vital signs and had no questions or concerns, and was very satisfied with overall care. MD Coleen      Disposition:    Discharged      DISCHARGE PLAN:  1. Discharge Medication List as of 2/26/2022  7:49 PM      START taking these medications    Details   cephALEXin (Keflex) 500 mg capsule Take 1 Capsule by mouth three (3) times daily for 7 days. , Normal, Disp-21 Capsule, R-0      bacitracin (BACITRACIN) 500 unit/gram oint Apply  to affected area three (3) times daily for 10 days. Apply to affected area, Normal, Disp-14 g, R-0         CONTINUE these medications which have NOT CHANGED    Details   omeprazole (PRILOSEC OTC) 20 mg tablet Take 20 mg by mouth daily. , Historical Med      dicyclomine (BENTYL) 20 mg tablet Take 1 Tab by mouth every six (6) hours as needed (abdominal cramps) for up to 20 doses. , Normal, Disp-20 Tab, R-0      ondansetron (ZOFRAN ODT) 4 mg disintegrating tablet Take 1 Tab by mouth every eight (8) hours as needed for Nausea., Normal, Disp-10 Tab, R-0      potassium chloride (KLOR-CON) 20 mEq pack Take 1 Packet by mouth daily. , Normal, Disp-7 Packet, R-0           2. Follow-up Information     Follow up With Specialties Details Why [facility] EMERGENCY DEPT Emergency Medicine  As needed, If symptoms worsen Bassam Wise        3. Return to ED if worse     Diagnosis     Clinical Impression:   1. Puncture wound    2. Need for tetanus booster        Attestations:    Fredi Granda MD    Please note that this dictation was completed with [employer], the computer voice recognition software. Quite often unanticipated grammatical, syntax, homophones, and other interpretive errors are inadvertently transcribed by the computer software. Please disregard these errors. Please excuse any errors that have escaped final proofreading. Thank you.

## 2022-02-26 NOTE — ED NOTES
Patient is alert and oriented x 4 and in no acute distress at this time. Respirations are at a regular rate, depth, and pattern. Patient updated on plan of care and has no questions or concerns at this time. Call bell within reach. Will continue to monitor. Please reference nursing assessment. Emergency Department Nursing Plan of Care       The Nursing Plan of Care is developed from the Nursing assessment and Emergency Department Attending provider initial evaluation. The plan of care may be reviewed in the ED Provider note.     The Plan of Care was developed with the following considerations:   Patient / Family readiness to learn indicated by:verbalized understanding and successful return demonstration  Persons(s) to be included in education: patient  Barriers to Learning/Limitations:No    Signed     Shakira Harris RN    2/26/2022   6:08 PM

## 2022-02-27 NOTE — ED NOTES
Patient ( given copy of dc instructions and 0 paper script(s) and 2 electronic scripts. Patient  verbalized understanding of instructions and script (s). Patient given a current medication reconciliation form and verbalized understanding of their medications. Patient  verbalized understanding of the importance of discussing medications with  his or her physician or clinic they will be following up with. Patient alert and oriented and in no acute distress. Patient offered wheelchair from treatment area to hospital entrance, patient denies wheelchair.

## 2022-02-27 NOTE — DISCHARGE INSTRUCTIONS
Thank You! It was a pleasure taking care of you in our Emergency Department today. We know that when you come to [facility], you are entrusting us with your health, comfort, and safety. Our physicians and nurses honor that trust, and truly appreciate the opportunity to care for you and your loved ones. We also value your feedback. If you receive a survey about your Emergency Department experience today, please fill it out. We care about our patients' feedback, and we listen to what you have to say. Thank you. Dr. Anthony Alfonso M.D.      ____________________________________________________________________  I have included a copy of your lab results and/or radiologic studies from today's visit so you can have them easily available at your follow-up visit. We hope you feel better and please do not hesitate to contact the ED if you have any questions at all! No results found for this or any previous visit (from the past 12 hour(s)). XR FOOT RT MIN 3 V   Final Result   No fracture identified        CT Results  (Last 48 hours)      None          The exam and treatment you received in the Emergency Department were for an urgent problem and are not intended as complete care. It is important that you follow up with a doctor, nurse practitioner, or physician assistant for ongoing care. If your symptoms become worse or you do not improve as expected and you are unable to reach your usual health care provider, you should return to the Emergency Department. We are available 24 hours a day. Please take your discharge instructions with you when you go to your follow-up appointment. If a prescription has been provided, please have it filled as soon as possible to prevent a delay in treatment. Read the entire medication instruction sheet provided to you by the pharmacy.  If you have any questions or reservations about taking the medication due to side effects or interactions with other medications, please call your primary care physician or contact the ER to speak with the charge nurse. Please make an appointment with your family doctor or the physician you were referred to for follow-up of this visit as instructed on your discharge paperwork. Return to the ER if you are unable to be seen or if you are unable to be seen in a timely manner. If you have any problem arranging the follow-up visit, contact the Emergency Department immediately.

## 2023-05-11 RX ORDER — POTASSIUM CHLORIDE 1.5 G/1.77G
1 POWDER, FOR SOLUTION ORAL DAILY
COMMUNITY
Start: 2019-08-24

## 2023-05-11 RX ORDER — DICYCLOMINE HCL 20 MG
TABLET ORAL EVERY 6 HOURS PRN
COMMUNITY
Start: 2019-08-24

## 2023-05-11 RX ORDER — OMEPRAZOLE 20 MG/1
20 TABLET, DELAYED RELEASE ORAL DAILY
COMMUNITY

## 2023-05-11 RX ORDER — ONDANSETRON 4 MG/1
TABLET, ORALLY DISINTEGRATING ORAL EVERY 8 HOURS PRN
COMMUNITY
Start: 2019-08-24